# Patient Record
Sex: MALE | Race: WHITE | Employment: FULL TIME | URBAN - METROPOLITAN AREA
[De-identification: names, ages, dates, MRNs, and addresses within clinical notes are randomized per-mention and may not be internally consistent; named-entity substitution may affect disease eponyms.]

---

## 2017-11-03 ENCOUNTER — TELEPHONE (OUTPATIENT)
Dept: INTERNAL MEDICINE CLINIC | Facility: CLINIC | Age: 27
End: 2017-11-03

## 2017-12-05 ENCOUNTER — TELEPHONE (OUTPATIENT)
Dept: INTERNAL MEDICINE CLINIC | Facility: CLINIC | Age: 27
End: 2017-12-05

## 2017-12-05 NOTE — TELEPHONE ENCOUNTER
Have called and sent [employer] message. Time for registered letter? Lost OV over a year ago,  Needs HTN follow up and CPE.

## 2018-01-25 ENCOUNTER — TELEPHONE (OUTPATIENT)
Dept: INTERNAL MEDICINE CLINIC | Facility: CLINIC | Age: 28
End: 2018-01-25

## 2018-05-08 ENCOUNTER — TELEPHONE (OUTPATIENT)
Dept: INTERNAL MEDICINE CLINIC | Facility: CLINIC | Age: 28
End: 2018-05-08

## 2018-05-08 NOTE — TELEPHONE ENCOUNTER
Pt returned our call advised that we had made numerous attempts to contact pt about scheduling annual physical or follow up to discuss HTN status. Phone calls, were not returned and [employer] messages were not responded to either. Last seen in 2016.   Pt ad

## 2018-06-23 ENCOUNTER — OFFICE VISIT (OUTPATIENT)
Dept: INTERNAL MEDICINE CLINIC | Facility: CLINIC | Age: 28
End: 2018-06-23

## 2018-06-23 VITALS
HEIGHT: 69 IN | HEART RATE: 80 BPM | BODY MASS INDEX: 21.81 KG/M2 | RESPIRATION RATE: 16 BRPM | SYSTOLIC BLOOD PRESSURE: 170 MMHG | DIASTOLIC BLOOD PRESSURE: 120 MMHG | TEMPERATURE: 98 F | WEIGHT: 147.25 LBS

## 2018-06-23 DIAGNOSIS — I10 ESSENTIAL HYPERTENSION: ICD-10-CM

## 2018-06-23 DIAGNOSIS — Z00.00 ENCOUNTER FOR PREVENTATIVE ADULT HEALTH CARE EXAMINATION: Primary | ICD-10-CM

## 2018-06-23 PROCEDURE — 99395 PREV VISIT EST AGE 18-39: CPT | Performed by: INTERNAL MEDICINE

## 2018-06-23 RX ORDER — LISINOPRIL AND HYDROCHLOROTHIAZIDE 25; 20 MG/1; MG/1
1 TABLET ORAL DAILY
Qty: 30 TABLET | Refills: 1 | Status: SHIPPED | OUTPATIENT
Start: 2018-06-23 | End: 2018-08-08 | Stop reason: ALTCHOICE

## 2018-06-23 NOTE — PROGRESS NOTES
Rustam Lenz is a 28year old male. HPI:   Patient presents with: Well Adult: physical, patient has hypertension and would like to address it  Patient presents to establish care/for CPX. He was seen at another [facility] in 2016.     Diet: Majority Position: Sitting, Cuff Size: adult)   Pulse 80   Temp 98.4 °F (36.9 °C) (Oral)   Resp 16   Ht 69\"   Wt 147 lb 4 oz   BMI 21.75 kg/m²   GENERAL: Alert and oriented, well developed, well nourished,in no apparent distress  SKIN: no rashes,no suspicious lesi

## 2018-06-23 NOTE — PATIENT INSTRUCTIONS
- Re-start blood pressure medication (lisinopril-HCTZ). Take 1 tablet every morning  - Check blood pressure at least 3-4 times a week. Keep a log of your readings. Don't start checking until you have been on the medications for 1 week.   - Goal is <140/9

## 2018-06-24 PROBLEM — I10 ESSENTIAL HYPERTENSION: Status: ACTIVE | Noted: 2018-06-24

## 2018-08-08 ENCOUNTER — OFFICE VISIT (OUTPATIENT)
Dept: INTERNAL MEDICINE CLINIC | Facility: CLINIC | Age: 28
End: 2018-08-08
Payer: COMMERCIAL

## 2018-08-08 VITALS
TEMPERATURE: 98 F | DIASTOLIC BLOOD PRESSURE: 126 MMHG | WEIGHT: 151.25 LBS | SYSTOLIC BLOOD PRESSURE: 182 MMHG | HEIGHT: 69 IN | HEART RATE: 88 BPM | BODY MASS INDEX: 22.4 KG/M2

## 2018-08-08 DIAGNOSIS — I10 ESSENTIAL HYPERTENSION: Primary | ICD-10-CM

## 2018-08-08 DIAGNOSIS — Z00.00 PREVENTATIVE HEALTH CARE: ICD-10-CM

## 2018-08-08 DIAGNOSIS — R40.20 LOSS OF CONSCIOUSNESS (HCC): ICD-10-CM

## 2018-08-08 PROCEDURE — 99214 OFFICE O/P EST MOD 30 MIN: CPT | Performed by: INTERNAL MEDICINE

## 2018-08-08 RX ORDER — LISINOPRIL 30 MG/1
30 TABLET ORAL DAILY
Qty: 30 TABLET | Refills: 1 | Status: SHIPPED | OUTPATIENT
Start: 2018-08-08

## 2018-08-08 NOTE — PATIENT INSTRUCTIONS
- We will switch you to one medication (just the lisinopril). We will do the 30 mg tablets. Take 1 tablet daily.  - Send me a [employer] message with your blood pressure readings in 2-3 weeks. - Follow up with me in 1-2 months.   My upcoming Saturdays are S

## 2018-08-08 NOTE — PROGRESS NOTES
Bridger Watkins is a 28year old male. HPI:   Patient presents with: Follow - Up: b/p  Patient presents for follow up on hypertension. He has been intermittently compliant with medications. Initially, was taking daily.   Blood pressure came down [employer] Pulse 88   Temp 98.1 °F (36.7 °C) (Oral)   Ht 69\"   Wt 151 lb 4 oz   BMI 22.34 kg/m²   GENERAL: Alert and oriented, well developed, well nourished,in no apparent distress  HEENT: atraumatic, PERRLA, EOMI, normal lid and conjunctiva  LUNGS: clear to ausc consciousness. Patient Care Team:  Fanny Katz MD as PCP - General (Internal Medicine)  The patient indicates understanding of these issues and agrees to the plan.   The patient is asked to return to clinic in 1 month with myself for follow up on

## (undated) NOTE — LETTER
12/13/2017    Personal and Confidential  Via Certified Mail    [address] [facility]    Dear Mr. Karyn Roy, patient care is our priority.   To best serve our patients, our mission is

## (undated) NOTE — LETTER
07/23/18        Stanley Ornelas  [address]  [address] [place] [address]      Dear Vlad Ennis,    [facility] records indicate that you have outstanding lab work and or testing that was ordered for you and has not yet been completed:          Basic Metabolic P

## (undated) NOTE — LETTER
09/07/18        Shantell Lr  [address]  [address] [address]      Dear Gian Grullon,    [facility] records indicate that you have outstanding lab work and or testing that was ordered for you and has not yet been completed:          FANNY Robles

## (undated) NOTE — LETTER
1/25/2018    Personal and Confidential  Via Certified Mail    [id number]    Dear Mr. Blayne Burgos,     Please accept this correspondence as notice that [address] will no longer provide medical s